# Patient Record
Sex: FEMALE | Race: WHITE | NOT HISPANIC OR LATINO | Employment: FULL TIME | URBAN - METROPOLITAN AREA
[De-identification: names, ages, dates, MRNs, and addresses within clinical notes are randomized per-mention and may not be internally consistent; named-entity substitution may affect disease eponyms.]

---

## 2017-05-16 ENCOUNTER — ALLSCRIPTS OFFICE VISIT (OUTPATIENT)
Dept: OTHER | Facility: OTHER | Age: 32
End: 2017-05-16

## 2017-05-16 ENCOUNTER — LAB (OUTPATIENT)
Dept: LAB | Facility: MEDICAL CENTER | Age: 32
End: 2017-05-16
Payer: COMMERCIAL

## 2017-05-16 ENCOUNTER — TRANSCRIBE ORDERS (OUTPATIENT)
Dept: ADMINISTRATIVE | Facility: HOSPITAL | Age: 32
End: 2017-05-16

## 2017-05-16 DIAGNOSIS — E01.0 IODINE-DEFICIENCY RELATED DIFFUSE GOITER: ICD-10-CM

## 2017-05-16 DIAGNOSIS — E04.9 ENLARGEMENT OF THYROID: ICD-10-CM

## 2017-05-16 DIAGNOSIS — E04.9 ENLARGEMENT OF THYROID: Primary | ICD-10-CM

## 2017-05-16 LAB
T4 SERPL-MCNC: 10.7 UG/DL (ref 4.7–13.3)
TSH SERPL DL<=0.05 MIU/L-ACNC: 1.3 UIU/ML (ref 0.36–3.74)

## 2017-05-16 PROCEDURE — 84443 ASSAY THYROID STIM HORMONE: CPT

## 2017-05-16 PROCEDURE — 36415 COLL VENOUS BLD VENIPUNCTURE: CPT

## 2017-05-16 PROCEDURE — 84436 ASSAY OF TOTAL THYROXINE: CPT

## 2017-05-25 ENCOUNTER — LAB CONVERSION - ENCOUNTER (OUTPATIENT)
Dept: OTHER | Facility: OTHER | Age: 32
End: 2017-05-25

## 2017-05-25 LAB
ADDITIONAL INFORMATION (HISTORICAL): ABNORMAL
ADEQUACY: (HISTORICAL): ABNORMAL
COMMENT (HISTORICAL): ABNORMAL
CYTOTECHNOLOGIST: (HISTORICAL): ABNORMAL
GENERAL CATEGORIZATION (HISTORICAL): ABNORMAL
HPV MRNA E6/E7 (HISTORICAL): NOT DETECTED
INTERPRETATION (HISTORICAL): ABNORMAL
LMP (HISTORICAL): ABNORMAL
PATHOLOGIST (HISTORICAL): ABNORMAL
PREV. BX: (HISTORICAL): ABNORMAL
PREV. PAP (HISTORICAL): ABNORMAL
SOURCE (HISTORICAL): ABNORMAL

## 2018-01-13 VITALS
DIASTOLIC BLOOD PRESSURE: 62 MMHG | SYSTOLIC BLOOD PRESSURE: 118 MMHG | BODY MASS INDEX: 20.73 KG/M2 | WEIGHT: 140 LBS | HEIGHT: 69 IN

## 2018-01-26 DIAGNOSIS — B37.3 VAGINAL CANDIDA: Primary | ICD-10-CM

## 2018-01-27 RX ORDER — FLUCONAZOLE 150 MG/1
150 TABLET ORAL ONCE
Qty: 1 TABLET | Refills: 0 | Status: SHIPPED | OUTPATIENT
Start: 2018-01-27 | End: 2018-01-27

## 2018-05-29 ENCOUNTER — ANNUAL EXAM (OUTPATIENT)
Dept: GYNECOLOGY | Facility: CLINIC | Age: 33
End: 2018-05-29
Payer: COMMERCIAL

## 2018-05-29 VITALS
DIASTOLIC BLOOD PRESSURE: 70 MMHG | BODY MASS INDEX: 21.71 KG/M2 | WEIGHT: 146.6 LBS | SYSTOLIC BLOOD PRESSURE: 90 MMHG | HEIGHT: 69 IN

## 2018-05-29 DIAGNOSIS — Z01.419 ENCOUNTER FOR GYNECOLOGICAL EXAMINATION WITHOUT ABNORMAL FINDING: ICD-10-CM

## 2018-05-29 DIAGNOSIS — Z30.41 ENCOUNTER FOR SURVEILLANCE OF CONTRACEPTIVE PILLS: Primary | ICD-10-CM

## 2018-05-29 DIAGNOSIS — Z12.4 ENCOUNTER FOR PAPANICOLAOU SMEAR FOR CERVICAL CANCER SCREENING: ICD-10-CM

## 2018-05-29 DIAGNOSIS — E04.1 THYROID NODULE: ICD-10-CM

## 2018-05-29 PROCEDURE — G0145 SCR C/V CYTO,THINLAYER,RESCR: HCPCS | Performed by: OBSTETRICS & GYNECOLOGY

## 2018-05-29 PROCEDURE — S0612 ANNUAL GYNECOLOGICAL EXAMINA: HCPCS | Performed by: OBSTETRICS & GYNECOLOGY

## 2018-05-29 RX ORDER — VALACYCLOVIR HYDROCHLORIDE 500 MG/1
TABLET, FILM COATED ORAL
COMMUNITY
Start: 2018-03-22

## 2018-05-29 RX ORDER — NORGESTIMATE AND ETHINYL ESTRADIOL 7DAYSX3 28
KIT ORAL
COMMUNITY
Start: 2018-03-22 | End: 2018-05-29 | Stop reason: SDUPTHER

## 2018-05-29 RX ORDER — NORGESTIMATE AND ETHINYL ESTRADIOL 7DAYSX3 28
1 KIT ORAL DAILY
Qty: 84 TABLET | Refills: 3 | Status: SHIPPED | OUTPATIENT
Start: 2018-05-29 | End: 2019-05-05 | Stop reason: SDUPTHER

## 2018-05-29 NOTE — PROGRESS NOTES
Assessment/Plan:         Diagnoses and all orders for this visit:    Thyroid nodule  -     US thyroid; Future    Encounter for gynecological examination without abnormal finding    Encounter for surveillance of contraceptive pills    Preconception consultation    Other orders  -     DAILY MULTIPLE VITAMINS/IRON PO; Take by mouth  -     TRI-SPRINTEC 0 18/0 215/0 25 MG-35 MCG per tablet;   -     valACYclovir (VALTREX) 500 mg tablet;   -     Cetirizine HCl (ZYRTEC ALLERGY) 10 MG CAPS; Take by mouth        Subjective:      Patient ID: Kimberly Shukla is a 29 y o  female  Annual exam  No c/o  Had US thyroid last year--thyroid nodule  Advised repeat scan 1 year  Presently on OC's  Considering conceiving next year    HPI    The following portions of the patient's history were reviewed and updated as appropriate: allergies, current medications, past family history, past medical history, past social history, past surgical history and problem list     Review of Systems   Constitutional: Negative  Gastrointestinal: Negative  Genitourinary: Negative  Objective:      BP 90/70 (BP Location: Left arm)   Ht 5' 8 5" (1 74 m)   Wt 66 5 kg (146 lb 9 6 oz)   LMP 05/09/2018 (Exact Date)   BMI 21 97 kg/m²          Physical Exam   Constitutional: She appears well-developed and well-nourished  Neck: Normal range of motion  Neck supple  No thyromegaly present  Cardiovascular: Normal rate, regular rhythm and normal heart sounds  Abdominal: Soft  Bowel sounds are normal  She exhibits no mass  There is no tenderness  Hernia confirmed negative in the right inguinal area and confirmed negative in the left inguinal area  Genitourinary: Vagina normal and uterus normal  There is no rash or lesion on the right labia  There is no rash or lesion on the left labia  Cervix exhibits no motion tenderness, no discharge and no friability  Right adnexum displays no mass, no tenderness and no fullness   Left adnexum displays no mass, no tenderness and no fullness  Lymphadenopathy:        Right: No inguinal adenopathy present  Left: No inguinal adenopathy present

## 2018-06-04 LAB
LAB AP GYN PRIMARY INTERPRETATION: NORMAL
Lab: NORMAL

## 2019-05-05 DIAGNOSIS — Z30.41 ENCOUNTER FOR SURVEILLANCE OF CONTRACEPTIVE PILLS: ICD-10-CM

## 2019-05-06 RX ORDER — NORGESTIMATE AND ETHINYL ESTRADIOL 7DAYSX3 28
KIT ORAL
Qty: 84 TABLET | Refills: 3 | Status: SHIPPED | OUTPATIENT
Start: 2019-05-06 | End: 2019-06-12 | Stop reason: SDUPTHER

## 2019-06-12 ENCOUNTER — ANNUAL EXAM (OUTPATIENT)
Dept: GYNECOLOGY | Facility: CLINIC | Age: 34
End: 2019-06-12
Payer: COMMERCIAL

## 2019-06-12 VITALS
BODY MASS INDEX: 21.48 KG/M2 | WEIGHT: 145 LBS | DIASTOLIC BLOOD PRESSURE: 70 MMHG | SYSTOLIC BLOOD PRESSURE: 118 MMHG | HEIGHT: 69 IN | HEART RATE: 73 BPM

## 2019-06-12 DIAGNOSIS — Z30.41 ENCOUNTER FOR SURVEILLANCE OF CONTRACEPTIVE PILLS: ICD-10-CM

## 2019-06-12 DIAGNOSIS — Z01.419 ENCOUNTER FOR GYNECOLOGICAL EXAMINATION WITHOUT ABNORMAL FINDING: Primary | ICD-10-CM

## 2019-06-12 DIAGNOSIS — Z01.419 ENCOUNTER FOR GYNECOLOGICAL EXAMINATION WITH PAPANICOLAOU SMEAR OF CERVIX: ICD-10-CM

## 2019-06-12 PROCEDURE — S0612 ANNUAL GYNECOLOGICAL EXAMINA: HCPCS | Performed by: OBSTETRICS & GYNECOLOGY

## 2019-06-12 PROCEDURE — G0145 SCR C/V CYTO,THINLAYER,RESCR: HCPCS | Performed by: OBSTETRICS & GYNECOLOGY

## 2019-06-12 RX ORDER — NORGESTIMATE AND ETHINYL ESTRADIOL 7DAYSX3 28
1 KIT ORAL DAILY
Qty: 84 TABLET | Refills: 3 | Status: SHIPPED | OUTPATIENT
Start: 2019-06-12 | End: 2020-06-21

## 2019-06-19 LAB
LAB AP GYN PRIMARY INTERPRETATION: NORMAL
Lab: NORMAL

## 2019-11-13 DIAGNOSIS — E04.1 THYROID NODULE: Primary | ICD-10-CM

## 2020-06-21 DIAGNOSIS — Z30.41 ENCOUNTER FOR SURVEILLANCE OF CONTRACEPTIVE PILLS: ICD-10-CM

## 2020-06-21 RX ORDER — NORGESTIMATE AND ETHINYL ESTRADIOL 7DAYSX3 28
KIT ORAL
Qty: 84 TABLET | Refills: 3 | Status: SHIPPED | OUTPATIENT
Start: 2020-06-21 | End: 2020-11-17 | Stop reason: SDUPTHER

## 2020-10-28 DIAGNOSIS — E04.2 MULTIPLE THYROID NODULES: Primary | ICD-10-CM

## 2020-11-17 ENCOUNTER — ANNUAL EXAM (OUTPATIENT)
Dept: GYNECOLOGY | Facility: CLINIC | Age: 35
End: 2020-11-17
Payer: COMMERCIAL

## 2020-11-17 VITALS
WEIGHT: 135 LBS | DIASTOLIC BLOOD PRESSURE: 70 MMHG | HEART RATE: 77 BPM | TEMPERATURE: 97.2 F | BODY MASS INDEX: 19.99 KG/M2 | HEIGHT: 69 IN | SYSTOLIC BLOOD PRESSURE: 108 MMHG

## 2020-11-17 DIAGNOSIS — Z01.419 ENCOUNTER FOR GYNECOLOGICAL EXAMINATION WITH PAPANICOLAOU SMEAR OF CERVIX: ICD-10-CM

## 2020-11-17 DIAGNOSIS — Z11.3 SCREENING FOR STDS (SEXUALLY TRANSMITTED DISEASES): Primary | ICD-10-CM

## 2020-11-17 DIAGNOSIS — Z30.41 ENCOUNTER FOR SURVEILLANCE OF CONTRACEPTIVE PILLS: ICD-10-CM

## 2020-11-17 DIAGNOSIS — Z01.419 ENCOUNTER FOR GYNECOLOGICAL EXAMINATION WITHOUT ABNORMAL FINDING: ICD-10-CM

## 2020-11-17 PROCEDURE — 87591 N.GONORRHOEAE DNA AMP PROB: CPT | Performed by: OBSTETRICS & GYNECOLOGY

## 2020-11-17 PROCEDURE — G0145 SCR C/V CYTO,THINLAYER,RESCR: HCPCS | Performed by: OBSTETRICS & GYNECOLOGY

## 2020-11-17 PROCEDURE — 99395 PREV VISIT EST AGE 18-39: CPT | Performed by: OBSTETRICS & GYNECOLOGY

## 2020-11-17 PROCEDURE — 87491 CHLMYD TRACH DNA AMP PROBE: CPT | Performed by: OBSTETRICS & GYNECOLOGY

## 2020-11-17 RX ORDER — NORGESTIMATE AND ETHINYL ESTRADIOL 7DAYSX3 28
1 KIT ORAL DAILY
Qty: 84 TABLET | Refills: 3 | Status: SHIPPED | OUTPATIENT
Start: 2020-11-17 | End: 2021-11-05

## 2020-11-20 LAB
C TRACH DNA SPEC QL NAA+PROBE: NEGATIVE
LAB AP GYN PRIMARY INTERPRETATION: NORMAL
Lab: NORMAL
N GONORRHOEA DNA SPEC QL NAA+PROBE: NEGATIVE

## 2021-06-14 ENCOUNTER — CLINICAL SUPPORT (OUTPATIENT)
Dept: GYNECOLOGY | Facility: CLINIC | Age: 36
End: 2021-06-14
Payer: COMMERCIAL

## 2021-06-14 VITALS
HEART RATE: 74 BPM | BODY MASS INDEX: 20.97 KG/M2 | DIASTOLIC BLOOD PRESSURE: 68 MMHG | WEIGHT: 142 LBS | SYSTOLIC BLOOD PRESSURE: 98 MMHG

## 2021-06-14 DIAGNOSIS — Z23 NEED FOR HPV VACCINATION: Primary | ICD-10-CM

## 2021-06-14 PROCEDURE — 90471 IMMUNIZATION ADMIN: CPT | Performed by: OBSTETRICS & GYNECOLOGY

## 2021-06-14 PROCEDURE — 90651 9VHPV VACCINE 2/3 DOSE IM: CPT | Performed by: OBSTETRICS & GYNECOLOGY

## 2021-06-14 PROCEDURE — 96372 THER/PROPH/DIAG INJ SC/IM: CPT | Performed by: OBSTETRICS & GYNECOLOGY

## 2021-08-30 ENCOUNTER — OFFICE VISIT (OUTPATIENT)
Dept: GYNECOLOGY | Facility: CLINIC | Age: 36
End: 2021-08-30
Payer: COMMERCIAL

## 2021-08-30 DIAGNOSIS — Z23 NEED FOR HPV VACCINATION: Primary | ICD-10-CM

## 2021-08-30 PROCEDURE — 90471 IMMUNIZATION ADMIN: CPT

## 2021-08-30 PROCEDURE — 90651 9VHPV VACCINE 2/3 DOSE IM: CPT

## 2021-09-15 ENCOUNTER — TELEPHONE (OUTPATIENT)
Dept: GYNECOLOGY | Facility: CLINIC | Age: 36
End: 2021-09-15

## 2021-09-15 NOTE — TELEPHONE ENCOUNTER
Patient called and is in process of getting HPV vaccine and is wondering if she should wait to have sex until after 3rd vaccine? Please call her  She is wondering if she did have sex with a condom, could she still contract HPV?

## 2021-11-05 DIAGNOSIS — Z30.41 ENCOUNTER FOR SURVEILLANCE OF CONTRACEPTIVE PILLS: ICD-10-CM

## 2021-11-05 RX ORDER — NORGESTIMATE AND ETHINYL ESTRADIOL 7DAYSX3 28
KIT ORAL
Qty: 84 TABLET | Refills: 3 | Status: SHIPPED | OUTPATIENT
Start: 2021-11-05 | End: 2021-12-01 | Stop reason: SDUPTHER

## 2021-12-01 ENCOUNTER — ANNUAL EXAM (OUTPATIENT)
Dept: GYNECOLOGY | Facility: CLINIC | Age: 36
End: 2021-12-01
Payer: COMMERCIAL

## 2021-12-01 VITALS
HEART RATE: 79 BPM | BODY MASS INDEX: 21.33 KG/M2 | SYSTOLIC BLOOD PRESSURE: 102 MMHG | DIASTOLIC BLOOD PRESSURE: 62 MMHG | WEIGHT: 144 LBS | HEIGHT: 69 IN

## 2021-12-01 DIAGNOSIS — Z23 NEED FOR HPV VACCINATION: Primary | ICD-10-CM

## 2021-12-01 DIAGNOSIS — Z01.419 ENCOUNTER FOR GYNECOLOGICAL EXAMINATION WITH PAPANICOLAOU SMEAR OF CERVIX: ICD-10-CM

## 2021-12-01 DIAGNOSIS — Z30.41 ENCOUNTER FOR SURVEILLANCE OF CONTRACEPTIVE PILLS: ICD-10-CM

## 2021-12-01 DIAGNOSIS — Z01.419 ENCOUNTER FOR GYNECOLOGICAL EXAMINATION WITHOUT ABNORMAL FINDING: ICD-10-CM

## 2021-12-01 PROCEDURE — 90471 IMMUNIZATION ADMIN: CPT

## 2021-12-01 PROCEDURE — 99395 PREV VISIT EST AGE 18-39: CPT | Performed by: OBSTETRICS & GYNECOLOGY

## 2021-12-01 PROCEDURE — G0145 SCR C/V CYTO,THINLAYER,RESCR: HCPCS | Performed by: OBSTETRICS & GYNECOLOGY

## 2021-12-01 PROCEDURE — 90651 9VHPV VACCINE 2/3 DOSE IM: CPT

## 2021-12-01 RX ORDER — NORGESTIMATE AND ETHINYL ESTRADIOL 7DAYSX3 28
1 KIT ORAL DAILY
Qty: 84 TABLET | Refills: 3 | Status: SHIPPED | OUTPATIENT
Start: 2021-12-01 | End: 2022-02-25 | Stop reason: SDUPTHER

## 2021-12-07 LAB
LAB AP GYN PRIMARY INTERPRETATION: NORMAL
Lab: NORMAL

## 2022-02-25 DIAGNOSIS — Z30.41 ENCOUNTER FOR SURVEILLANCE OF CONTRACEPTIVE PILLS: ICD-10-CM

## 2022-02-27 RX ORDER — NORGESTIMATE AND ETHINYL ESTRADIOL 7DAYSX3 28
1 KIT ORAL DAILY
Qty: 84 TABLET | Refills: 3 | Status: SHIPPED | OUTPATIENT
Start: 2022-02-27

## 2023-09-05 ENCOUNTER — TELEPHONE (OUTPATIENT)
Dept: GYNECOLOGY | Facility: CLINIC | Age: 38
End: 2023-09-05

## 2023-09-05 NOTE — TELEPHONE ENCOUNTER
Patient has not been here for several years and would like STI testing. No symptoms, she has new partner and would like testing. Please place orders for her. She does not use [facility]. Please put orders in [facility] for her. Patient WANTS call back when orders are placed. She did make AE appt in October.

## 2023-09-06 ENCOUNTER — TELEPHONE (OUTPATIENT)
Dept: GYNECOLOGY | Facility: CLINIC | Age: 38
End: 2023-09-06

## 2023-09-06 DIAGNOSIS — Z11.3 SCREENING EXAMINATION FOR STD (SEXUALLY TRANSMITTED DISEASE): Primary | ICD-10-CM

## 2023-09-06 NOTE — TELEPHONE ENCOUNTER
Ceci Sr called and will call back with either a number for a lab or She will  scripts. [phone]

## 2023-09-07 ENCOUNTER — DOCUMENTATION (OUTPATIENT)
Dept: GYNECOLOGY | Facility: CLINIC | Age: 38
End: 2023-09-07

## 2023-09-08 LAB
EXTERNAL CHLAMYDIA RESULT: NEGATIVE
EXTERNAL HIV SCREEN: NORMAL
N GONORRHOEA RRNA SPEC QL PROBE: NEGATIVE

## 2023-09-09 LAB
C TRACH RRNA SPEC QL NAA+PROBE: NEGATIVE
HBV SURFACE AB SER QL: REACTIVE
HIV 1+2 AB+HIV1 P24 AG SERPL QL IA: NON REACTIVE
N GONORRHOEA RRNA SPEC QL NAA+PROBE: NEGATIVE
RPR SER QL: NON REACTIVE

## 2023-09-13 ENCOUNTER — TELEPHONE (OUTPATIENT)
Dept: GYNECOLOGY | Facility: CLINIC | Age: 38
End: 2023-09-13

## 2023-09-14 ENCOUNTER — TELEPHONE (OUTPATIENT)
Dept: GYNECOLOGY | Facility: CLINIC | Age: 38
End: 2023-09-14

## 2023-10-13 ENCOUNTER — ANNUAL EXAM (OUTPATIENT)
Dept: GYNECOLOGY | Facility: CLINIC | Age: 38
End: 2023-10-13

## 2023-10-13 VITALS
WEIGHT: 138 LBS | HEIGHT: 69 IN | DIASTOLIC BLOOD PRESSURE: 64 MMHG | BODY MASS INDEX: 20.44 KG/M2 | SYSTOLIC BLOOD PRESSURE: 104 MMHG | HEART RATE: 71 BPM

## 2023-10-13 DIAGNOSIS — Z01.419 ENCOUNTER FOR GYNECOLOGICAL EXAMINATION WITH PAPANICOLAOU SMEAR OF CERVIX: ICD-10-CM

## 2023-10-13 DIAGNOSIS — Z01.419 ENCOUNTER FOR GYNECOLOGICAL EXAMINATION WITHOUT ABNORMAL FINDING: Primary | ICD-10-CM

## 2023-10-13 PROCEDURE — G0145 SCR C/V CYTO,THINLAYER,RESCR: HCPCS | Performed by: OBSTETRICS & GYNECOLOGY

## 2023-10-13 NOTE — PROGRESS NOTES
Assessment/Plan:       Diagnoses and all orders for this visit:    Encounter for gynecological examination without abnormal finding    Refer to [facility] for potential egg harvesting    Subjective:      Patient ID: Carlos Franz is a 41 y.o. female. HPI patient presents the office for annual examination. She offers no complaints. She discontinued oral contraceptives last year. She did not like the way she was feeling on them. Since discontinuing the pills her manses have continued to be regular. Denies any dysmenorrhea. Patient is interested in pursuing egg harvesting. Patient's mother had breast cancer. She was BRCA negative. The following portions of the patient's history were reviewed and updated as appropriate: She  has a past medical history of Amenorrhea, Condyloma, and Herpes simplex infection. She There are no problems to display for this patient. She  has a past surgical history that includes Nephrectomy; Splenectomy; US guided thyroid biopsy (1/18/2019); US guided thyroid biopsy (12/7/2018); and [place] cystogram (4/4/2014). Her family history includes Atrial fibrillation in her father; Breast cancer (age of onset: 72) in her mother; Heart disease in her father and paternal uncle; Other in her paternal aunt; Rheumatic fever in her maternal grandmother. She  reports that she has never smoked. She has never used smokeless tobacco. She reports that she does not drink alcohol and does not use drugs. Current Outpatient Medications   Medication Sig Dispense Refill    Cetirizine HCl (ZYRTEC ALLERGY) 10 MG CAPS Take by mouth      DAILY MULTIPLE VITAMINS/IRON PO Take by mouth      Tri-Sprintec 0.18/0.215/0.25 MG-35 MCG per tablet Take 1 tablet by mouth daily 84 tablet 3    valACYclovir (VALTREX) 500 mg tablet        No current facility-administered medications for this visit.      Current Outpatient Medications on File Prior to Visit   Medication Sig    Cetirizine HCl (ZYRTEC ALLERGY) 10 MG CAPS Take by mouth    DAILY MULTIPLE VITAMINS/IRON PO Take by mouth    Tri-Sprintec 0.18/0.215/0.25 MG-35 MCG per tablet Take 1 tablet by mouth daily    valACYclovir (VALTREX) 500 mg tablet      No current facility-administered medications on file prior to visit. She is allergic to molds & smuts and pollen extract. .    Review of Systems   Constitutional: Negative. HENT:  Negative for sore throat and trouble swallowing. Gastrointestinal: Negative. Genitourinary: Negative. Objective: There were no vitals taken for this visit. Physical Exam  Vitals reviewed. Constitutional:       Appearance: Normal appearance. She is normal weight. Cardiovascular:      Rate and Rhythm: Normal rate and regular rhythm. Pulses: Normal pulses. Heart sounds: Normal heart sounds. No murmur heard. Pulmonary:      Effort: Pulmonary effort is normal. No respiratory distress. Breath sounds: Normal breath sounds. Chest:   Breasts:     Right: No swelling, bleeding, inverted nipple, mass, nipple discharge, skin change or tenderness. Left: No swelling, bleeding, inverted nipple, mass, nipple discharge, skin change or tenderness. Abdominal:      General: There is no distension. Palpations: Abdomen is soft. There is no mass. Tenderness: There is no abdominal tenderness. There is no guarding or rebound. Hernia: No hernia is present. There is no hernia in the left inguinal area or right inguinal area. Genitourinary:     General: Normal vulva. Labia:         Right: No rash, tenderness or lesion. Left: No rash, tenderness or lesion. Vagina: Normal.      Cervix: Normal.      Uterus: Normal.       Adnexa:         Right: No mass, tenderness or fullness. Left: No mass, tenderness or fullness. Musculoskeletal:      Cervical back: Normal range of motion and neck supple. No tenderness. Lymphadenopathy:      Cervical: No cervical adenopathy.       Upper Body: Right upper body: No supraclavicular, axillary or pectoral adenopathy. Left upper body: No supraclavicular, axillary or pectoral adenopathy. Lower Body: No right inguinal adenopathy. No left inguinal adenopathy. Neurological:      Mental Status: She is alert.

## 2023-10-20 LAB
LAB AP GYN PRIMARY INTERPRETATION: NORMAL
Lab: NORMAL

## 2024-03-25 ENCOUNTER — TELEPHONE (OUTPATIENT)
Age: 39
End: 2024-03-25

## 2024-03-25 NOTE — TELEPHONE ENCOUNTER
Patient called to request an Rx to go back on birth control pills as discussed with Dr. Maldonado at her Yearly exam on 10/13/23.  She previously took Tri Sprintec with a generally good result but had slight nausea when she took it. She trusts the doctor's judgement on whether or not to continue with that pill.  LMP 3/22/24, still has menses.  Patient would like to start pill today.  She wants enough to last until her Yearly is due in October. She lives in [place] and uses [facility] [address].

## 2024-03-25 NOTE — TELEPHONE ENCOUNTER
Pt called in to check and see if prescription was sent in. Reviewed it was not sent yet, will notify provider to send tomorrow.

## 2024-03-26 DIAGNOSIS — Z30.41 ENCOUNTER FOR SURVEILLANCE OF CONTRACEPTIVE PILLS: ICD-10-CM

## 2024-03-26 RX ORDER — NORGESTIMATE AND ETHINYL ESTRADIOL 7DAYSX3 28
1 KIT ORAL DAILY
Qty: 84 TABLET | Refills: 3 | Status: SHIPPED | OUTPATIENT
Start: 2024-03-26

## 2024-10-21 ENCOUNTER — TELEPHONE (OUTPATIENT)
Dept: GYNECOLOGY | Facility: CLINIC | Age: 39
End: 2024-10-21

## 2024-10-21 NOTE — TELEPHONE ENCOUNTER
Received request from [facility] for patient's recent pap test. Faxed it to location

## 2024-10-25 ENCOUNTER — APPOINTMENT (OUTPATIENT)
Dept: LAB | Facility: MEDICAL CENTER | Age: 39
End: 2024-10-25
Payer: COMMERCIAL

## 2024-10-25 ENCOUNTER — ANNUAL EXAM (OUTPATIENT)
Dept: GYNECOLOGY | Facility: CLINIC | Age: 39
End: 2024-10-25
Payer: COMMERCIAL

## 2024-10-25 VITALS
SYSTOLIC BLOOD PRESSURE: 118 MMHG | WEIGHT: 134 LBS | HEIGHT: 69 IN | DIASTOLIC BLOOD PRESSURE: 72 MMHG | BODY MASS INDEX: 19.85 KG/M2

## 2024-10-25 DIAGNOSIS — Z11.3 SCREENING EXAMINATION FOR STD (SEXUALLY TRANSMITTED DISEASE): ICD-10-CM

## 2024-10-25 DIAGNOSIS — Z01.419 ENCOUNTER FOR GYNECOLOGICAL EXAMINATION WITH PAPANICOLAOU SMEAR OF CERVIX: ICD-10-CM

## 2024-10-25 DIAGNOSIS — Z11.3 SCREENING EXAMINATION FOR STD (SEXUALLY TRANSMITTED DISEASE): Primary | ICD-10-CM

## 2024-10-25 LAB
HBV SURFACE AG SER QL: NORMAL
HIV 1+2 AB+HIV1 P24 AG SERPL QL IA: NORMAL
HIV 2 AB SERPL QL IA: NORMAL
HIV1 AB SERPL QL IA: NORMAL
HIV1 P24 AG SERPL QL IA: NORMAL
TREPONEMA PALLIDUM IGG+IGM AB [PRESENCE] IN SERUM OR PLASMA BY IMMUNOASSAY: NORMAL

## 2024-10-25 PROCEDURE — 87340 HEPATITIS B SURFACE AG IA: CPT

## 2024-10-25 PROCEDURE — 99395 PREV VISIT EST AGE 18-39: CPT | Performed by: OBSTETRICS & GYNECOLOGY

## 2024-10-25 PROCEDURE — 86780 TREPONEMA PALLIDUM: CPT

## 2024-10-25 PROCEDURE — 87389 HIV-1 AG W/HIV-1&-2 AB AG IA: CPT

## 2024-10-25 PROCEDURE — 87591 N.GONORRHOEAE DNA AMP PROB: CPT | Performed by: OBSTETRICS & GYNECOLOGY

## 2024-10-25 PROCEDURE — 87491 CHLMYD TRACH DNA AMP PROBE: CPT | Performed by: OBSTETRICS & GYNECOLOGY

## 2024-10-25 PROCEDURE — 36415 COLL VENOUS BLD VENIPUNCTURE: CPT

## 2024-10-25 PROCEDURE — G0145 SCR C/V CYTO,THINLAYER,RESCR: HCPCS | Performed by: OBSTETRICS & GYNECOLOGY

## 2024-10-25 NOTE — PROGRESS NOTES
"Ambulatory Visit  Name: Karissa Simms      : 1985      MRN: [id number]  Encounter Provider: Moises Maldonado DO  Encounter Date: 10/25/2024   Encounter department: [facility]    Assessment & Plan  Screening examination for STD (sexually transmitted disease)    Orders:    RPR-Syphilis Screening (Total Syphilis IGG/IGM); Future    Hepatitis B surface antigen; Future    HIV 1/2 AG/AB w Reflex [facility] for 2 yr old and above; Future    Chlamydia/GC amplified DNA by PCR    Encounter for gynecological examination with Papanicolaou smear of cervix    Orders:    Liquid-based pap, screening      History of Present Illness     Karissa Simms is a 39 y.o. female who presents for annual examination.  Offers no complaints.  Denies any vaginal irritation, burning, discharge or bleeding.  Denies any dysuria, hematuria urgency urinary incontinence.  No GI complaints.  Patient requesting STD testing.  Patient's menses are regular.  Patient's mother had breast cancer.  She is BRCA negative.      Review of Systems   Constitutional: Negative.    HENT:  Negative for sore throat and trouble swallowing.    Gastrointestinal: Negative.    Genitourinary: Negative.            Objective     /72   Ht 5' 9\" (1.753 m)   Wt 60.8 kg (134 lb)   LMP 10/06/2024   BMI 19.79 kg/m²     Physical Exam  Vitals reviewed.   Constitutional:       Appearance: Normal appearance.   Cardiovascular:      Rate and Rhythm: Normal rate and regular rhythm.      Pulses: Normal pulses.      Heart sounds: Normal heart sounds. No murmur heard.  Pulmonary:      Effort: Pulmonary effort is normal. No respiratory distress.      Breath sounds: Normal breath sounds.   Chest:   Breasts:     Right: No swelling, bleeding, inverted nipple, mass, nipple discharge, skin change or tenderness.      Left: No swelling, bleeding, inverted nipple, mass, nipple discharge, skin change or tenderness.   Abdominal:      General: " There is no distension.      Palpations: Abdomen is soft. There is no mass.      Tenderness: There is no abdominal tenderness. There is no guarding or rebound.      Hernia: No hernia is present. There is no hernia in the left inguinal area or right inguinal area.   Genitourinary:     General: Normal vulva.      Labia:         Right: No rash, tenderness or lesion.         Left: No rash, tenderness or lesion.       Vagina: Normal.      Cervix: Normal.      Uterus: Normal.       Adnexa:         Right: No mass, tenderness or fullness.          Left: No mass, tenderness or fullness.     Musculoskeletal:      Cervical back: Normal range of motion and neck supple. No tenderness.   Lymphadenopathy:      Cervical: No cervical adenopathy.      Upper Body:      Right upper body: No supraclavicular, axillary or pectoral adenopathy.      Left upper body: No supraclavicular, axillary or pectoral adenopathy.      Lower Body: No right inguinal adenopathy. No left inguinal adenopathy.   Neurological:      Mental Status: She is alert.

## 2024-10-29 LAB
C TRACH DNA SPEC QL NAA+PROBE: NEGATIVE
N GONORRHOEA DNA SPEC QL NAA+PROBE: NEGATIVE

## 2024-10-31 LAB
LAB AP GYN PRIMARY INTERPRETATION: NORMAL
Lab: NORMAL